# Patient Record
Sex: MALE | Race: WHITE | NOT HISPANIC OR LATINO | URBAN - METROPOLITAN AREA
[De-identification: names, ages, dates, MRNs, and addresses within clinical notes are randomized per-mention and may not be internally consistent; named-entity substitution may affect disease eponyms.]

---

## 2017-12-14 ENCOUNTER — APPOINTMENT (OUTPATIENT)
Dept: URBAN - METROPOLITAN AREA CLINIC 186 | Age: 22
Setting detail: DERMATOLOGY
End: 2017-12-14

## 2017-12-14 VITALS — HEIGHT: 69 IN | WEIGHT: 190 LBS

## 2017-12-14 DIAGNOSIS — L20.84 INTRINSIC (ALLERGIC) ECZEMA: ICD-10-CM

## 2017-12-14 PROCEDURE — OTHER COUNSELING: OTHER

## 2017-12-14 PROCEDURE — 99213 OFFICE O/P EST LOW 20 MIN: CPT

## 2017-12-14 PROCEDURE — OTHER TREATMENT REGIMEN: OTHER

## 2017-12-14 PROCEDURE — OTHER PRESCRIPTION: OTHER

## 2017-12-14 RX ORDER — DESOXIMETASONE 2.5 MG/ML
SPRAY TOPICAL TWICE DAILY
Qty: 1 | Refills: 1 | Status: ERX | COMMUNITY
Start: 2017-12-14

## 2017-12-14 ASSESSMENT — LOCATION DETAILED DESCRIPTION DERM
LOCATION DETAILED: STERNUM
LOCATION DETAILED: RIGHT POSTERIOR EAR
LOCATION DETAILED: LEFT POSTERIOR EAR
LOCATION DETAILED: RIGHT SUPERIOR MEDIAL UPPER BACK

## 2017-12-14 ASSESSMENT — LOCATION SIMPLE DESCRIPTION DERM
LOCATION SIMPLE: RIGHT UPPER BACK
LOCATION SIMPLE: RIGHT EAR
LOCATION SIMPLE: LEFT EAR
LOCATION SIMPLE: CHEST

## 2017-12-14 ASSESSMENT — LOCATION ZONE DERM
LOCATION ZONE: EAR
LOCATION ZONE: TRUNK

## 2017-12-14 NOTE — PROCEDURE: TREATMENT REGIMEN
Detail Level: Simple
Discontinue Regimen: Clobex spray
Otc Regimen: Xyzal or Zyrtec
Continue Regimen: Elidel on 1 week break and on face
Initiate Treatment: Topicort spray

## 2017-12-27 ENCOUNTER — RX ONLY (RX ONLY)
Age: 22
End: 2017-12-27

## 2017-12-27 RX ORDER — PIMECROLIMUS 10 MG/G
1% CREAM TOPICAL TWICE DAILY
Qty: 100 | Refills: 2 | Status: ERX

## 2018-02-12 ENCOUNTER — RX ONLY (RX ONLY)
Age: 23
End: 2018-02-12

## 2018-02-12 RX ORDER — DESOXIMETASONE 2.5 MG/ML
SPRAY TOPICAL TWICE DAILY
Qty: 1 | Refills: 0 | Status: ERX